# Patient Record
Sex: FEMALE | Race: WHITE | NOT HISPANIC OR LATINO | URBAN - METROPOLITAN AREA
[De-identification: names, ages, dates, MRNs, and addresses within clinical notes are randomized per-mention and may not be internally consistent; named-entity substitution may affect disease eponyms.]

---

## 2017-06-29 ENCOUNTER — NEW PATIENT (OUTPATIENT)
Dept: URBAN - METROPOLITAN AREA CLINIC 24 | Facility: CLINIC | Age: 52
End: 2017-06-29
Payer: MEDICAID

## 2017-06-29 DIAGNOSIS — H43.391 OTHER VITREOUS OPACITIES, RIGHT EYE: ICD-10-CM

## 2017-06-29 DIAGNOSIS — H11.31 SUBCONJUNCTIVAL HEMORRHAGE OF RIGHT EYE: Primary | ICD-10-CM

## 2017-06-29 PROCEDURE — 92025 CPTRIZED CORNEAL TOPOGRAPHY: CPT | Performed by: OPTOMETRIST

## 2017-06-29 PROCEDURE — 92004 COMPRE OPH EXAM NEW PT 1/>: CPT | Performed by: OPTOMETRIST

## 2017-06-29 ASSESSMENT — KERATOMETRY
OS: 46.65
OD: 46.46

## 2017-06-29 ASSESSMENT — INTRAOCULAR PRESSURE
OS: 16
OD: 16

## 2017-06-29 ASSESSMENT — VISUAL ACUITY
OD: 20/20
OS: 20/20

## 2018-12-06 ENCOUNTER — OFFICE VISIT (OUTPATIENT)
Dept: URBAN - METROPOLITAN AREA CLINIC 16 | Facility: CLINIC | Age: 53
End: 2018-12-06
Payer: MEDICAID

## 2018-12-06 DIAGNOSIS — H25.012 CORTICAL AGE-RELATED CATARACT, LEFT EYE: Primary | ICD-10-CM

## 2018-12-06 PROCEDURE — 92004 COMPRE OPH EXAM NEW PT 1/>: CPT | Performed by: OPTOMETRIST

## 2018-12-06 ASSESSMENT — INTRAOCULAR PRESSURE
OD: 16
OS: 18

## 2018-12-06 ASSESSMENT — KERATOMETRY
OS: 46.75
OD: 46.50

## 2018-12-06 NOTE — IMPRESSION/PLAN
Impression: Cortical age-related cataract, left eye: H25.012. Hx of TBI left side Plan: Discussed diagnosis. Will continue to observe.